# Patient Record
Sex: FEMALE | Race: WHITE | NOT HISPANIC OR LATINO | Employment: FULL TIME | URBAN - METROPOLITAN AREA
[De-identification: names, ages, dates, MRNs, and addresses within clinical notes are randomized per-mention and may not be internally consistent; named-entity substitution may affect disease eponyms.]

---

## 2024-12-05 ENCOUNTER — OFFICE VISIT (OUTPATIENT)
Dept: URGENT CARE | Facility: CLINIC | Age: 44
End: 2024-12-05
Payer: COMMERCIAL

## 2024-12-05 VITALS
HEART RATE: 103 BPM | DIASTOLIC BLOOD PRESSURE: 93 MMHG | BODY MASS INDEX: 45.83 KG/M2 | SYSTOLIC BLOOD PRESSURE: 142 MMHG | HEIGHT: 67 IN | TEMPERATURE: 97.7 F | OXYGEN SATURATION: 97 % | RESPIRATION RATE: 17 BRPM | WEIGHT: 292 LBS

## 2024-12-05 DIAGNOSIS — L03.115 CELLULITIS OF RIGHT LOWER EXTREMITY: Primary | ICD-10-CM

## 2024-12-05 PROCEDURE — 99213 OFFICE O/P EST LOW 20 MIN: CPT | Performed by: NURSE PRACTITIONER

## 2024-12-05 RX ORDER — CEPHALEXIN 500 MG/1
500 CAPSULE ORAL 4 TIMES DAILY
Qty: 40 CAPSULE | Refills: 0 | Status: SHIPPED | OUTPATIENT
Start: 2024-12-05 | End: 2024-12-15

## 2024-12-05 NOTE — PATIENT INSTRUCTIONS
Recommend taking a probiotic while taking this medication, and for 7 days after you finish it.   A probiotic is a supplement you can buy over-the-counter that helps support the good bacteria in your body while taking antibiotics. Probiotics help to avoid the side effects of antibiotics, such as diarrhea or yeast infections. It is best to take a probiotic about 2 hours after your dose of antibiotic.

## 2024-12-05 NOTE — PROGRESS NOTES
[facility] URGENT [facility]ARE   ANNIE NOTE:      Name: Madhavi Rees, 43 y.o.    CSN:[id number]   MRN:[id number]    PCP: Tiesha Rodríguez MD    ALL:    Allergies   Allergen Reactions    Bactrim [Sulfamethoxazole-Trimethoprim] Rash       History:    Chief Complaint: Rash (Pt states rash and pain in right leg. Pt tacho a Pala around the rash and it has since grown past it. )    Encounter Date: 12/5/2024      HPI: The history was obtained from the patient. Madhavi is a 43 y.o. female, who presents with a chief complaint of Rash (Pt states rash and pain in right leg. Pt tacho a Pala around the rash and it has since grown past it. ) redness with pain to the right lower leg/ankle that began 2 days ago with increased redness and warmth.  Denies calf pain, patient very concerned for DVT as her father has had 1 in the past.  She has no history of blood clots.  Denies calf pain, fevers, chills.    PMHx:    No past medical history on file.           Current Outpatient Medications   Medication Sig Dispense Refill    cephalexin (Keflex) 500 mg capsule Take 1 capsule (500 mg) by mouth 4 times a day for 10 days. 40 capsule 0     No current facility-administered medications for this visit.         PMSx:  No past surgical history on file.    Fam Hx: No family history on file.    SOC. Hx:     Social History     Socioeconomic History    Marital status: Single     Spouse name: Not on file    Number of children: Not on file    Years of education: Not on file    Highest education level: Not on file   Occupational History    Not on file   Tobacco Use    Smoking status: Not on file    Smokeless tobacco: Not on file   Substance and Sexual Activity    Alcohol use: Not on file    Drug use: Not on file    Sexual activity: Not on file   Other Topics Concern    Not on file   Social History Narrative    Not on file     Social Drivers of Health     Financial Resource Strain: Not on file   Food Insecurity: Not on file    Transportation Needs: Not on file   Physical Activity: Not on file   Stress: Not on file   Social Connections: Not on file   Intimate Partner Violence: Not on file   Housing Stability: Not on file         Vitals:    12/05/24 1139   BP: (!) 142/93   Pulse: 103   Resp: 17   Temp: 36.5 °C (97.7 °F)   SpO2: 97%     132 kg (292 lb)          Physical Exam  Vitals and nursing note reviewed.   Constitutional:       Appearance: Normal appearance.   HENT:      Head: Normocephalic and atraumatic.      Mouth/Throat:      Mouth: Mucous membranes are moist.      Pharynx: Oropharynx is clear.   Eyes:      Pupils: Pupils are equal, round, and reactive to light.   Cardiovascular:      Rate and Rhythm: Normal rate and regular rhythm.      Pulses: Normal pulses.      Heart sounds: Normal heart sounds.   Pulmonary:      Effort: Pulmonary effort is normal.      Breath sounds: Normal breath sounds.   Abdominal:      General: Abdomen is flat. Bowel sounds are normal.      Palpations: Abdomen is soft.   Musculoskeletal:         General: Normal range of motion.      Cervical back: Normal range of motion.   Skin:     General: Skin is warm and dry.      Capillary Refill: Capillary refill takes less than 2 seconds.             Comments: Negative Homans' sign   Neurological:      Mental Status: She is alert and oriented to person, place, and time.         ____________________________________________________________________    I did personally review Madhavi's past medical history, surgical history, social history, as well as family history (when relevant).  In this case, I also oversaw the her drug management by reviewing her medication list, allergy list, as well as the medications that I prescribed during the UC course and/or recommended as an out-patient (including possible OTC medications such as acetaminophen, NSAIDs , etc).    After reviewing the items above, I did look at previous medical documentation, such as recent hospitalizations,  office visits, and/or recent consultations with PCP/specialist.                          SDOH:   Another factor that I considered in Madhavi's care was her Social Determinants of Health (SDOH). During this  encounter, she did not have social determinants of health. Those SDOH influencing Madhavi's care are: none      _____________________________________________________________________       COURSE/MEDICAL DECISION MAKING:    Madhavi is a 43 y.o., who presents with a working diagnosis of   1. Cellulitis of right lower extremity        Madhavi was seen today for rash.  Diagnoses and all orders for this visit:  Cellulitis of right lower extremity (Primary)  -     cephalexin (Keflex) 500 mg capsule; Take 1 capsule (500 mg) by mouth 4 times a day for 10 days.         Plan of care reviewed with patient.  If symptoms change and/or worsen will follow-up with primary care provider, return to urgent care, or go to the nearest emergency department for further evaluation.  Patient states understanding and is agreeable with plan of care.      DONTE Blanca, DNP   Advanced Practice Provider  [facility] U[facility]    Please note: While the patient may or may not have received printed discharge paperwork, all relevant medical findings, test results, and treatment details are accessible through the electronic medical record system. The patient is encouraged to review their chart via the patient portal for comprehensive information and follow-up instructions.